# Patient Record
Sex: MALE | Race: WHITE | NOT HISPANIC OR LATINO | URBAN - METROPOLITAN AREA
[De-identification: names, ages, dates, MRNs, and addresses within clinical notes are randomized per-mention and may not be internally consistent; named-entity substitution may affect disease eponyms.]

---

## 2023-04-13 DIAGNOSIS — I10 HTN (HYPERTENSION), BENIGN: Primary | ICD-10-CM

## 2023-04-13 RX ORDER — LISINOPRIL 2.5 MG/1
2.5 TABLET ORAL DAILY
Qty: 90 TABLET | Refills: 1 | Status: SHIPPED | OUTPATIENT
Start: 2023-04-13 | End: 2023-09-20 | Stop reason: SDUPTHER

## 2023-04-13 RX ORDER — LISINOPRIL 2.5 MG/1
1 TABLET ORAL DAILY
COMMUNITY
Start: 2021-08-21 | End: 2023-04-13 | Stop reason: SDUPTHER

## 2023-04-15 DIAGNOSIS — E78.1 HYPERTRIGLYCERIDEMIA: Primary | ICD-10-CM

## 2023-04-15 DIAGNOSIS — I10 HTN (HYPERTENSION), BENIGN: ICD-10-CM

## 2023-04-15 RX ORDER — FENOFIBRATE 160 MG/1
1 TABLET ORAL DAILY
COMMUNITY
Start: 2021-02-15 | End: 2023-04-15 | Stop reason: SDUPTHER

## 2023-04-15 RX ORDER — FENOFIBRATE 160 MG/1
160 TABLET ORAL DAILY
Qty: 90 TABLET | Refills: 1 | Status: SHIPPED | OUTPATIENT
Start: 2023-04-15 | End: 2023-10-10

## 2023-05-03 ENCOUNTER — TELEMEDICINE (OUTPATIENT)
Dept: PRIMARY CARE | Facility: CLINIC | Age: 68
End: 2023-05-03
Payer: COMMERCIAL

## 2023-05-03 DIAGNOSIS — U07.1 COVID-19 VIRUS DETECTED: Primary | ICD-10-CM

## 2023-05-03 PROCEDURE — 99212 OFFICE O/P EST SF 10 MIN: CPT | Performed by: INTERNAL MEDICINE

## 2023-05-03 RX ORDER — BENZONATATE 100 MG/1
100 CAPSULE ORAL 3 TIMES DAILY PRN
Qty: 30 CAPSULE | Refills: 1 | Status: SHIPPED | OUTPATIENT
Start: 2023-05-03 | End: 2023-05-13

## 2023-05-03 NOTE — PROGRESS NOTES
Subjective   Patient ID: Raheel Bruno is a 67 y.o. male who presents for No chief complaint on file..    HPI patient is attended by video call because he is complaining of cough congestion for the past 2 days.  He tested positive for COVID-19 this morning.  His wife also tested positive for COVID few days ago.  He is otherwise doing well and denies any fever, chills, shortness of breath, wheezing, headache, loss of taste and smell.  Past medical history significant for hypertension dyslipidemia and prediabetes.    Review of Systems   Constitutional: Negative.    HENT:  Positive for congestion.    Eyes: Negative.    Respiratory:  Positive for cough.    Cardiovascular: Negative.    Gastrointestinal: Negative.    Endocrine: Negative.    Genitourinary: Negative.    Musculoskeletal: Negative.    Skin: Negative.    Allergic/Immunologic: Negative.    Neurological: Negative.    Hematological: Negative.    Psychiatric/Behavioral: Negative.         Objective   There were no vitals taken for this visit.    Physical Examnot done    Assessment/Plan    patient is encouraged drink fluids and is started on Tessalon Perles and Lagevrio leg Breo for recent COVID 19 infection.  He will continue his routine home medication.  He will follow-up with his PCP if he has no improvement in his symptoms.

## 2023-05-05 ASSESSMENT — ENCOUNTER SYMPTOMS
COUGH: 1
CONSTITUTIONAL NEGATIVE: 1
ENDOCRINE NEGATIVE: 1
MUSCULOSKELETAL NEGATIVE: 1
EYES NEGATIVE: 1
GASTROINTESTINAL NEGATIVE: 1
PSYCHIATRIC NEGATIVE: 1
HEMATOLOGIC/LYMPHATIC NEGATIVE: 1
NEUROLOGICAL NEGATIVE: 1
CARDIOVASCULAR NEGATIVE: 1
ALLERGIC/IMMUNOLOGIC NEGATIVE: 1

## 2023-05-11 ENCOUNTER — APPOINTMENT (OUTPATIENT)
Dept: PRIMARY CARE | Facility: CLINIC | Age: 68
End: 2023-05-11
Payer: COMMERCIAL

## 2023-06-14 ENCOUNTER — OFFICE VISIT (OUTPATIENT)
Dept: PRIMARY CARE | Facility: CLINIC | Age: 68
End: 2023-06-14
Payer: COMMERCIAL

## 2023-06-14 VITALS
OXYGEN SATURATION: 97 % | WEIGHT: 184 LBS | BODY MASS INDEX: 31.41 KG/M2 | RESPIRATION RATE: 18 BRPM | HEIGHT: 64 IN | DIASTOLIC BLOOD PRESSURE: 78 MMHG | HEART RATE: 66 BPM | SYSTOLIC BLOOD PRESSURE: 122 MMHG | TEMPERATURE: 97.3 F

## 2023-06-14 DIAGNOSIS — I10 ESSENTIAL (PRIMARY) HYPERTENSION: ICD-10-CM

## 2023-06-14 DIAGNOSIS — J06.9 VIRAL URI WITH COUGH: ICD-10-CM

## 2023-06-14 DIAGNOSIS — E78.1 HYPERTRIGLYCERIDEMIA: ICD-10-CM

## 2023-06-14 DIAGNOSIS — Z00.00 ROUTINE GENERAL MEDICAL EXAMINATION AT HEALTH CARE FACILITY: Primary | ICD-10-CM

## 2023-06-14 DIAGNOSIS — Z12.11 SCREEN FOR COLON CANCER: ICD-10-CM

## 2023-06-14 DIAGNOSIS — Z13.6 SCREENING FOR CARDIOVASCULAR CONDITION: ICD-10-CM

## 2023-06-14 DIAGNOSIS — Z13.1 DIABETES MELLITUS SCREENING: ICD-10-CM

## 2023-06-14 DIAGNOSIS — R35.1 NOCTURIA: ICD-10-CM

## 2023-06-14 DIAGNOSIS — E66.9 CLASS 1 OBESITY WITH BODY MASS INDEX (BMI) OF 31.0 TO 31.9 IN ADULT, UNSPECIFIED OBESITY TYPE, UNSPECIFIED WHETHER SERIOUS COMORBIDITY PRESENT: ICD-10-CM

## 2023-06-14 PROBLEM — G47.00 INSOMNIA: Status: ACTIVE | Noted: 2023-06-14

## 2023-06-14 PROBLEM — R73.09 ELEVATED GLUCOSE: Status: ACTIVE | Noted: 2023-06-14

## 2023-06-14 PROCEDURE — 3078F DIAST BP <80 MM HG: CPT | Performed by: FAMILY MEDICINE

## 2023-06-14 PROCEDURE — G0439 PPPS, SUBSEQ VISIT: HCPCS | Performed by: FAMILY MEDICINE

## 2023-06-14 PROCEDURE — 1160F RVW MEDS BY RX/DR IN RCRD: CPT | Performed by: FAMILY MEDICINE

## 2023-06-14 PROCEDURE — 3074F SYST BP LT 130 MM HG: CPT | Performed by: FAMILY MEDICINE

## 2023-06-14 PROCEDURE — 3008F BODY MASS INDEX DOCD: CPT | Performed by: FAMILY MEDICINE

## 2023-06-14 PROCEDURE — 99214 OFFICE O/P EST MOD 30 MIN: CPT | Performed by: FAMILY MEDICINE

## 2023-06-14 PROCEDURE — 1036F TOBACCO NON-USER: CPT | Performed by: FAMILY MEDICINE

## 2023-06-14 PROCEDURE — 1159F MED LIST DOCD IN RCRD: CPT | Performed by: FAMILY MEDICINE

## 2023-06-14 RX ORDER — ATORVASTATIN CALCIUM 20 MG/1
20 TABLET, FILM COATED ORAL
Qty: 90 TABLET | Refills: 3 | Status: SHIPPED | OUTPATIENT
Start: 2023-06-14 | End: 2024-05-21

## 2023-06-14 RX ORDER — ATORVASTATIN CALCIUM 20 MG/1
20 TABLET, FILM COATED ORAL
COMMUNITY
Start: 2017-02-16 | End: 2023-06-14 | Stop reason: SDUPTHER

## 2023-06-14 RX ORDER — ASPIRIN 81 MG/1
TABLET ORAL
COMMUNITY

## 2023-06-14 ASSESSMENT — PATIENT HEALTH QUESTIONNAIRE - PHQ9
1. LITTLE INTEREST OR PLEASURE IN DOING THINGS: NOT AT ALL
2. FEELING DOWN, DEPRESSED OR HOPELESS: NOT AT ALL
SUM OF ALL RESPONSES TO PHQ9 QUESTIONS 1 AND 2: 0

## 2023-06-14 ASSESSMENT — ENCOUNTER SYMPTOMS
DIARRHEA: 0
COUGH: 1
DEPRESSION: 0
FATIGUE: 0
HEADACHES: 0
ARTHRALGIAS: 0
VOMITING: 0
CONSTIPATION: 0
MYALGIAS: 0
POLYDIPSIA: 0
ABDOMINAL PAIN: 0
PALPITATIONS: 0
CHEST TIGHTNESS: 0
LOSS OF SENSATION IN FEET: 0
FEVER: 0
NAUSEA: 0
OCCASIONAL FEELINGS OF UNSTEADINESS: 0
SHORTNESS OF BREATH: 0
DIFFICULTY URINATING: 0
SORE THROAT: 0
WHEEZING: 0
CHILLS: 0

## 2023-06-14 ASSESSMENT — COPD QUESTIONNAIRES: COPD: 0

## 2023-06-14 NOTE — PROGRESS NOTES
"Subjective   Reason for Visit: Raheel Bruno is an 67 y.o. male here for a Medicare Wellness visit.          Reviewed all medications by prescribing practitioner or clinical pharmacist (such as prescriptions, OTCs, herbal therapies and supplements) and documented in the medical record.    Cough  This is a new problem. The current episode started in the past 7 days. The problem has been gradually improving. The problem occurs constantly. The cough is Productive of sputum. Pertinent negatives include no chest pain, chills, fever, headaches, myalgias, rash, sore throat, shortness of breath or wheezing. The symptoms are aggravated by lying down and pollens. The treatment provided mild relief. There is no history of asthma or COPD.     HLD: tries to watch diet, tries to stay active    HTN: well controlled, no issues    Patient Care Team:  Caro Lea MD as PCP - General (Family Medicine)  Alireza Garcia MD as PCP - [facility] PCP     Review of Systems   Constitutional:  Negative for chills, fatigue and fever.   HENT:  Negative for sore throat.    Eyes:  Negative for visual disturbance.   Respiratory:  Positive for cough. Negative for chest tightness, shortness of breath and wheezing.    Cardiovascular:  Negative for chest pain and palpitations.   Gastrointestinal:  Negative for abdominal pain, constipation, diarrhea, nausea and vomiting.   Endocrine: Negative for polydipsia and polyuria.   Genitourinary:  Negative for difficulty urinating.   Musculoskeletal:  Negative for arthralgias and myalgias.   Skin:  Negative for rash.   Neurological:  Negative for headaches.   Psychiatric/Behavioral:  Negative for behavioral problems.        Objective   Vitals:  /78 (BP Location: Left arm, Patient Position: Sitting)   Pulse 66   Temp 36.3 °C (97.3 °F) (Temporal)   Resp 18   Ht 1.626 m (5' 4.02\")   Wt 83.5 kg (184 lb)   SpO2 97%   BMI 31.57 kg/m²       Physical Exam  Constitutional:       Appearance: Normal " appearance.   HENT:      Head: Normocephalic and atraumatic.      Right Ear: External ear normal.      Left Ear: External ear normal.      Nose: Nose normal.   Eyes:      Extraocular Movements: Extraocular movements intact.      Conjunctiva/sclera: Conjunctivae normal.      Pupils: Pupils are equal, round, and reactive to light.   Cardiovascular:      Rate and Rhythm: Normal rate and regular rhythm.   Pulmonary:      Effort: Pulmonary effort is normal.      Breath sounds: Normal breath sounds.   Abdominal:      General: Abdomen is flat. Bowel sounds are normal.      Palpations: Abdomen is soft.   Musculoskeletal:         General: Normal range of motion.      Cervical back: Normal range of motion and neck supple.   Skin:     General: Skin is warm.   Neurological:      General: No focal deficit present.      Mental Status: He is alert and oriented to person, place, and time.   Psychiatric:         Mood and Affect: Mood normal.         Assessment/Plan   Problem List Items Addressed This Visit       Essential (primary) hypertension    Current Assessment & Plan     Well controlled  Continue current meds  Check renal function         Hypertriglyceridemia    Current Assessment & Plan     Due for lipid panel, LFTs  Continue to work on lifestyle         Relevant Medications    atorvastatin (Lipitor) 20 mg tablet    Routine general medical examination at health care facility - Primary    Relevant Orders    CBC    Viral URI with cough    Current Assessment & Plan     Recommend symptomatic treatment including increased hydration, regular nasal saline, use of analgesics such as acetaminophen/ibuprofen and humidified air  Follow up if symptoms fail to improve            Other Visit Diagnoses       Class 1 obesity with body mass index (BMI) of 31.0 to 31.9 in adult, unspecified obesity type, unspecified whether serious comorbidity present        Diabetes mellitus screening        Relevant Orders    Hemoglobin A1C    Comprehensive  Metabolic Panel    Screening for cardiovascular condition        Relevant Orders    Lipid panel    Screen for colon cancer        Relevant Orders    Colonoscopy    Nocturia        Relevant Orders    Prostate Specific Antigen

## 2023-06-14 NOTE — ASSESSMENT & PLAN NOTE
Recommend symptomatic treatment including increased hydration, regular nasal saline, use of analgesics such as acetaminophen/ibuprofen and humidified air  Follow up if symptoms fail to improve

## 2023-06-23 DIAGNOSIS — J06.9 VIRAL URI WITH COUGH: Primary | ICD-10-CM

## 2023-06-23 RX ORDER — BENZONATATE 200 MG/1
200 CAPSULE ORAL 3 TIMES DAILY PRN
Qty: 42 CAPSULE | Refills: 0 | Status: SHIPPED | OUTPATIENT
Start: 2023-06-23 | End: 2023-07-23

## 2023-06-23 RX ORDER — AZITHROMYCIN 250 MG/1
250 TABLET, FILM COATED ORAL DAILY
Qty: 6 TABLET | Refills: 0 | Status: SHIPPED | OUTPATIENT
Start: 2023-06-23 | End: 2023-09-26 | Stop reason: ALTCHOICE

## 2023-07-25 ENCOUNTER — LAB (OUTPATIENT)
Dept: LAB | Facility: LAB | Age: 68
End: 2023-07-25
Payer: COMMERCIAL

## 2023-07-25 DIAGNOSIS — Z13.6 SCREENING FOR CARDIOVASCULAR CONDITION: ICD-10-CM

## 2023-07-25 DIAGNOSIS — Z00.00 ROUTINE GENERAL MEDICAL EXAMINATION AT HEALTH CARE FACILITY: ICD-10-CM

## 2023-07-25 DIAGNOSIS — R35.1 NOCTURIA: ICD-10-CM

## 2023-07-25 DIAGNOSIS — Z13.1 DIABETES MELLITUS SCREENING: ICD-10-CM

## 2023-07-25 LAB
ALANINE AMINOTRANSFERASE (SGPT) (U/L) IN SER/PLAS: 31 U/L (ref 10–52)
ALBUMIN (G/DL) IN SER/PLAS: 4.3 G/DL (ref 3.4–5)
ALKALINE PHOSPHATASE (U/L) IN SER/PLAS: 36 U/L (ref 33–136)
ANION GAP IN SER/PLAS: 14 MMOL/L (ref 10–20)
ASPARTATE AMINOTRANSFERASE (SGOT) (U/L) IN SER/PLAS: 20 U/L (ref 9–39)
BILIRUBIN TOTAL (MG/DL) IN SER/PLAS: 0.4 MG/DL (ref 0–1.2)
CALCIUM (MG/DL) IN SER/PLAS: 9.7 MG/DL (ref 8.6–10.6)
CARBON DIOXIDE, TOTAL (MMOL/L) IN SER/PLAS: 27 MMOL/L (ref 21–32)
CHLORIDE (MMOL/L) IN SER/PLAS: 106 MMOL/L (ref 98–107)
CHOLESTEROL (MG/DL) IN SER/PLAS: 173 MG/DL (ref 0–199)
CHOLESTEROL IN HDL (MG/DL) IN SER/PLAS: 45.2 MG/DL
CHOLESTEROL/HDL RATIO: 3.8
CREATININE (MG/DL) IN SER/PLAS: 1.21 MG/DL (ref 0.5–1.3)
ERYTHROCYTE DISTRIBUTION WIDTH (RATIO) BY AUTOMATED COUNT: 14 % (ref 11.5–14.5)
ERYTHROCYTE MEAN CORPUSCULAR HEMOGLOBIN CONCENTRATION (G/DL) BY AUTOMATED: 32.9 G/DL (ref 32–36)
ERYTHROCYTE MEAN CORPUSCULAR VOLUME (FL) BY AUTOMATED COUNT: 92 FL (ref 80–100)
ERYTHROCYTES (10*6/UL) IN BLOOD BY AUTOMATED COUNT: 4.98 X10E12/L (ref 4.5–5.9)
ESTIMATED AVERAGE GLUCOSE FOR HBA1C: 134 MG/DL
GFR MALE: 65 ML/MIN/1.73M2
GLUCOSE (MG/DL) IN SER/PLAS: 114 MG/DL (ref 74–99)
HEMATOCRIT (%) IN BLOOD BY AUTOMATED COUNT: 45.6 % (ref 41–52)
HEMOGLOBIN (G/DL) IN BLOOD: 15 G/DL (ref 13.5–17.5)
HEMOGLOBIN A1C/HEMOGLOBIN TOTAL IN BLOOD: 6.3 %
LDL: 92 MG/DL (ref 0–99)
LEUKOCYTES (10*3/UL) IN BLOOD BY AUTOMATED COUNT: 8.1 X10E9/L (ref 4.4–11.3)
NRBC (PER 100 WBCS) BY AUTOMATED COUNT: 0 /100 WBC (ref 0–0)
PLATELETS (10*3/UL) IN BLOOD AUTOMATED COUNT: 271 X10E9/L (ref 150–450)
POTASSIUM (MMOL/L) IN SER/PLAS: 4.3 MMOL/L (ref 3.5–5.3)
PROSTATE SPECIFIC AG (NG/ML) IN SER/PLAS: 2.9 NG/ML (ref 0–4)
PROTEIN TOTAL: 6.7 G/DL (ref 6.4–8.2)
SODIUM (MMOL/L) IN SER/PLAS: 143 MMOL/L (ref 136–145)
TRIGLYCERIDE (MG/DL) IN SER/PLAS: 180 MG/DL (ref 0–149)
UREA NITROGEN (MG/DL) IN SER/PLAS: 20 MG/DL (ref 6–23)
VLDL: 36 MG/DL (ref 0–40)

## 2023-07-25 PROCEDURE — 83036 HEMOGLOBIN GLYCOSYLATED A1C: CPT

## 2023-07-25 PROCEDURE — 36415 COLL VENOUS BLD VENIPUNCTURE: CPT

## 2023-07-25 PROCEDURE — 85027 COMPLETE CBC AUTOMATED: CPT

## 2023-07-25 PROCEDURE — 80053 COMPREHEN METABOLIC PANEL: CPT

## 2023-07-25 PROCEDURE — 84153 ASSAY OF PSA TOTAL: CPT

## 2023-07-25 PROCEDURE — 80061 LIPID PANEL: CPT

## 2023-09-20 DIAGNOSIS — I10 HTN (HYPERTENSION), BENIGN: ICD-10-CM

## 2023-09-20 RX ORDER — LISINOPRIL 2.5 MG/1
2.5 TABLET ORAL DAILY
Qty: 90 TABLET | Refills: 1 | Status: SHIPPED | OUTPATIENT
Start: 2023-09-20 | End: 2024-03-18

## 2023-09-26 ENCOUNTER — OFFICE VISIT (OUTPATIENT)
Dept: PRIMARY CARE | Facility: CLINIC | Age: 68
End: 2023-09-26
Payer: COMMERCIAL

## 2023-09-26 VITALS
SYSTOLIC BLOOD PRESSURE: 120 MMHG | WEIGHT: 182.8 LBS | HEIGHT: 64 IN | OXYGEN SATURATION: 97 % | TEMPERATURE: 97.3 F | HEART RATE: 72 BPM | DIASTOLIC BLOOD PRESSURE: 80 MMHG | BODY MASS INDEX: 31.21 KG/M2

## 2023-09-26 DIAGNOSIS — R68.84 JAW PAIN, NON-TMJ: Primary | ICD-10-CM

## 2023-09-26 PROBLEM — R73.09 ELEVATED GLUCOSE: Status: RESOLVED | Noted: 2023-06-14 | Resolved: 2023-09-26

## 2023-09-26 PROBLEM — Z00.00 ROUTINE GENERAL MEDICAL EXAMINATION AT HEALTH CARE FACILITY: Status: RESOLVED | Noted: 2023-06-14 | Resolved: 2023-09-26

## 2023-09-26 PROBLEM — J06.9 VIRAL URI WITH COUGH: Status: RESOLVED | Noted: 2023-06-14 | Resolved: 2023-09-26

## 2023-09-26 PROCEDURE — 1125F AMNT PAIN NOTED PAIN PRSNT: CPT | Performed by: FAMILY MEDICINE

## 2023-09-26 PROCEDURE — 1036F TOBACCO NON-USER: CPT | Performed by: FAMILY MEDICINE

## 2023-09-26 PROCEDURE — 1160F RVW MEDS BY RX/DR IN RCRD: CPT | Performed by: FAMILY MEDICINE

## 2023-09-26 PROCEDURE — 3074F SYST BP LT 130 MM HG: CPT | Performed by: FAMILY MEDICINE

## 2023-09-26 PROCEDURE — 3079F DIAST BP 80-89 MM HG: CPT | Performed by: FAMILY MEDICINE

## 2023-09-26 PROCEDURE — 1159F MED LIST DOCD IN RCRD: CPT | Performed by: FAMILY MEDICINE

## 2023-09-26 PROCEDURE — 99213 OFFICE O/P EST LOW 20 MIN: CPT | Performed by: FAMILY MEDICINE

## 2023-09-26 PROCEDURE — 3008F BODY MASS INDEX DOCD: CPT | Performed by: FAMILY MEDICINE

## 2023-09-26 ASSESSMENT — PAIN SCALES - GENERAL: PAINLEVEL: 6

## 2023-09-26 NOTE — ASSESSMENT & PLAN NOTE
No ear infection noted  Differential includes dental vs less likely sialadenitis vs inflammation from other source  Discussed conservative management at this time  Recommend monitoring and follow up with dentist for evaluation if pain persists in the jaw  If any swelling in the neck, cheek or any new rash, redness then return for reevaluation  Discussed red flag symptoms which would warrant reevaluation  Continue analgesics like ibuprofen, can consider warm compress  If symptoms worsen or change then return for reevaluation

## 2023-09-26 NOTE — PROGRESS NOTES
"Subjective   Patient ID: Raheel Bruno is a 68 y.o. male who presents for Earache (Left ear ache ).    HPI   Started having left ear pain, having some tragus discomfort, now has spread down to more jaw pain, going on for about 4 days, no improvement, some movement of the pain, taking ibuprofen which decreases pain but does not eliminate.  No fever or chills.      Review of Systems  Negative unless noted in HPI    Objective   /80 (BP Location: Left arm, Patient Position: Sitting, BP Cuff Size: Adult)   Pulse 72   Temp 36.3 °C (97.3 °F) (Temporal)   Ht 1.626 m (5' 4\")   Wt 82.9 kg (182 lb 12.8 oz)   SpO2 97%   BMI 31.38 kg/m²     Physical Exam  Constitutional:       Appearance: Normal appearance.   HENT:      Head: Normocephalic and atraumatic.      Jaw: Tenderness (left jaw) present.      Salivary Glands: Left salivary gland is not diffusely enlarged or tender.      Left Ear: Tympanic membrane, ear canal and external ear normal.   Neurological:      Mental Status: He is alert.         Assessment/Plan   Problem List Items Addressed This Visit             ICD-10-CM    Jaw pain, non-TMJ - Primary R68.84     No ear infection noted  Differential includes dental vs less likely sialadenitis vs inflammation from other source  Discussed conservative management at this time  Recommend monitoring and follow up with dentist for evaluation if pain persists in the jaw  If any swelling in the neck, cheek or any new rash, redness then return for reevaluation  Discussed red flag symptoms which would warrant reevaluation  Continue analgesics like ibuprofen, can consider warm compress  If symptoms worsen or change then return for reevaluation               "

## 2023-10-09 DIAGNOSIS — E78.1 HYPERTRIGLYCERIDEMIA: ICD-10-CM

## 2023-10-10 RX ORDER — FENOFIBRATE 160 MG/1
160 TABLET ORAL DAILY
Qty: 90 TABLET | Refills: 3 | Status: SHIPPED | OUTPATIENT
Start: 2023-10-10

## 2023-10-13 DIAGNOSIS — R53.83 OTHER FATIGUE: Primary | ICD-10-CM

## 2023-10-13 DIAGNOSIS — R06.83 SNORING: ICD-10-CM

## 2024-01-03 ENCOUNTER — OFFICE VISIT (OUTPATIENT)
Dept: PRIMARY CARE | Facility: CLINIC | Age: 69
End: 2024-01-03
Payer: COMMERCIAL

## 2024-01-03 VITALS
HEART RATE: 55 BPM | DIASTOLIC BLOOD PRESSURE: 76 MMHG | BODY MASS INDEX: 31 KG/M2 | SYSTOLIC BLOOD PRESSURE: 124 MMHG | WEIGHT: 181.6 LBS | TEMPERATURE: 97.5 F | HEIGHT: 64 IN | OXYGEN SATURATION: 98 %

## 2024-01-03 DIAGNOSIS — I10 ESSENTIAL (PRIMARY) HYPERTENSION: Primary | ICD-10-CM

## 2024-01-03 DIAGNOSIS — R20.9 ALTERATION IN TACTILE SENSE: ICD-10-CM

## 2024-01-03 PROCEDURE — 3078F DIAST BP <80 MM HG: CPT | Performed by: FAMILY MEDICINE

## 2024-01-03 PROCEDURE — 3074F SYST BP LT 130 MM HG: CPT | Performed by: FAMILY MEDICINE

## 2024-01-03 PROCEDURE — 1126F AMNT PAIN NOTED NONE PRSNT: CPT | Performed by: FAMILY MEDICINE

## 2024-01-03 PROCEDURE — 3008F BODY MASS INDEX DOCD: CPT | Performed by: FAMILY MEDICINE

## 2024-01-03 PROCEDURE — 1036F TOBACCO NON-USER: CPT | Performed by: FAMILY MEDICINE

## 2024-01-03 PROCEDURE — 1159F MED LIST DOCD IN RCRD: CPT | Performed by: FAMILY MEDICINE

## 2024-01-03 PROCEDURE — 1160F RVW MEDS BY RX/DR IN RCRD: CPT | Performed by: FAMILY MEDICINE

## 2024-01-03 PROCEDURE — 99214 OFFICE O/P EST MOD 30 MIN: CPT | Performed by: FAMILY MEDICINE

## 2024-01-03 ASSESSMENT — PAIN SCALES - GENERAL: PAINLEVEL: 0-NO PAIN

## 2024-01-03 ASSESSMENT — PATIENT HEALTH QUESTIONNAIRE - PHQ9
SUM OF ALL RESPONSES TO PHQ9 QUESTIONS 1 AND 2: 0
1. LITTLE INTEREST OR PLEASURE IN DOING THINGS: NOT AT ALL
2. FEELING DOWN, DEPRESSED OR HOPELESS: NOT AT ALL

## 2024-01-03 ASSESSMENT — ENCOUNTER SYMPTOMS
LOSS OF SENSATION IN FEET: 0
DEPRESSION: 0
OCCASIONAL FEELINGS OF UNSTEADINESS: 0

## 2024-01-03 NOTE — PROGRESS NOTES
"Subjective   Patient ID: Raheel Bruno is a 68 y.o. male who presents for Hypertension.    HPI   HTN: doing well with medication, no issues or problems    Having a strange sensation on the left shoulder in the last month.  Feels like hair brushing against shoulder but nothing is there.  Happens intermittently.  No pattern like exertion, activity seems to be related to the discomfort. No neck issues.  No abdominal pain.  No rash or skin issues.    Concerned that this is a tactile hallucination and could be related to a dementia.  Father had supranuclear palsy so always concerned.  Denies any significant mental health issues or cognitive changes out of the norm.    Review of Systems  Negative unless noted in HPI    Objective   /76 (BP Location: Left arm, Patient Position: Sitting, BP Cuff Size: Small adult)   Pulse 55   Temp 36.4 °C (97.5 °F) (Temporal)   Ht 1.626 m (5' 4\")   Wt 82.4 kg (181 lb 9.6 oz)   SpO2 98%   BMI 31.17 kg/m²     Physical Exam  Constitutional:       Appearance: He is normal weight.   Eyes:      Extraocular Movements: Extraocular movements intact.      Pupils: Pupils are equal, round, and reactive to light.   Cardiovascular:      Rate and Rhythm: Normal rate and regular rhythm.      Heart sounds: No murmur heard.     No gallop.   Pulmonary:      Effort: Pulmonary effort is normal.      Breath sounds: Normal breath sounds.   Musculoskeletal:         General: No swelling or tenderness. Normal range of motion.      Cervical back: Normal range of motion.   Skin:     General: Skin is warm and dry.      Findings: No rash.   Neurological:      General: No focal deficit present.      Mental Status: He is alert and oriented to person, place, and time.         Assessment/Plan   Problem List Items Addressed This Visit             ICD-10-CM    Essential (primary) hypertension - Primary I10     Well controlled, continue current regimen  Labs up to date  Encourage ongoing lifestyle monitoring      "    Alteration in tactile sense R20.9     Atypical sensation of the left shoulder  No pattern or obvious cause  Differential includes presentation of neuropathy, HSV manifestation vs dermatologic issue  Discussed tactile hallucinations and overall no obvious triggers  Recommend trial of moisturizer and cortisone cream to see if this alleviates  If persistent or worsening return for reevaluation and will obtain lab work and consider imaging  Discussed possible neurology referral, pt hesitant regarding workup

## 2024-01-03 NOTE — ASSESSMENT & PLAN NOTE
Atypical sensation of the left shoulder  No pattern or obvious cause  Differential includes presentation of neuropathy, HSV manifestation vs dermatologic issue  Discussed tactile hallucinations and overall no obvious triggers  Recommend trial of moisturizer and cortisone cream to see if this alleviates  If persistent or worsening return for reevaluation and will obtain lab work and consider imaging  Discussed possible neurology referral, pt hesitant regarding workup

## 2024-01-03 NOTE — ASSESSMENT & PLAN NOTE
Well controlled, continue current regimen  Labs up to date  Encourage ongoing lifestyle monitoring

## 2024-02-26 ENCOUNTER — CLINICAL SUPPORT (OUTPATIENT)
Dept: SLEEP MEDICINE | Facility: HOSPITAL | Age: 69
End: 2024-02-26
Payer: COMMERCIAL

## 2024-02-26 DIAGNOSIS — R06.83 SNORING: ICD-10-CM

## 2024-02-26 DIAGNOSIS — R53.83 OTHER FATIGUE: ICD-10-CM

## 2024-02-26 PROCEDURE — 95806 SLEEP STUDY UNATT&RESP EFFT: CPT | Performed by: GENERAL PRACTICE

## 2024-03-12 DIAGNOSIS — G47.33 MODERATE OBSTRUCTIVE SLEEP APNEA: Primary | ICD-10-CM

## 2024-03-18 DIAGNOSIS — I10 HTN (HYPERTENSION), BENIGN: ICD-10-CM

## 2024-03-18 RX ORDER — LISINOPRIL 2.5 MG/1
2.5 TABLET ORAL DAILY
Qty: 90 TABLET | Refills: 3 | Status: SHIPPED | OUTPATIENT
Start: 2024-03-18

## 2024-05-21 DIAGNOSIS — E78.1 HYPERTRIGLYCERIDEMIA: ICD-10-CM

## 2024-05-21 RX ORDER — ATORVASTATIN CALCIUM 20 MG/1
20 TABLET, FILM COATED ORAL DAILY
Qty: 90 TABLET | Refills: 3 | Status: SHIPPED | OUTPATIENT
Start: 2024-05-21

## 2024-06-20 ENCOUNTER — APPOINTMENT (OUTPATIENT)
Dept: PRIMARY CARE | Facility: CLINIC | Age: 69
End: 2024-06-20
Payer: COMMERCIAL

## 2024-06-20 VITALS
OXYGEN SATURATION: 96 % | BODY MASS INDEX: 30.39 KG/M2 | DIASTOLIC BLOOD PRESSURE: 88 MMHG | WEIGHT: 178 LBS | HEIGHT: 64 IN | HEART RATE: 60 BPM | SYSTOLIC BLOOD PRESSURE: 122 MMHG

## 2024-06-20 DIAGNOSIS — M54.2 NECK PAIN ON LEFT SIDE: ICD-10-CM

## 2024-06-20 DIAGNOSIS — Z12.5 SCREENING FOR PROSTATE CANCER: ICD-10-CM

## 2024-06-20 DIAGNOSIS — Z13.6 SCREENING FOR CARDIOVASCULAR CONDITION: ICD-10-CM

## 2024-06-20 DIAGNOSIS — Z00.00 ROUTINE GENERAL MEDICAL EXAMINATION AT HEALTH CARE FACILITY: Primary | ICD-10-CM

## 2024-06-20 DIAGNOSIS — Z13.1 DIABETES MELLITUS SCREENING: ICD-10-CM

## 2024-06-20 PROCEDURE — 1159F MED LIST DOCD IN RCRD: CPT | Performed by: FAMILY MEDICINE

## 2024-06-20 PROCEDURE — 1124F ACP DISCUSS-NO DSCNMKR DOCD: CPT | Performed by: FAMILY MEDICINE

## 2024-06-20 PROCEDURE — 99397 PER PM REEVAL EST PAT 65+ YR: CPT | Performed by: FAMILY MEDICINE

## 2024-06-20 PROCEDURE — 1170F FXNL STATUS ASSESSED: CPT | Performed by: FAMILY MEDICINE

## 2024-06-20 PROCEDURE — 3074F SYST BP LT 130 MM HG: CPT | Performed by: FAMILY MEDICINE

## 2024-06-20 PROCEDURE — 3079F DIAST BP 80-89 MM HG: CPT | Performed by: FAMILY MEDICINE

## 2024-06-20 PROCEDURE — 1160F RVW MEDS BY RX/DR IN RCRD: CPT | Performed by: FAMILY MEDICINE

## 2024-06-20 ASSESSMENT — ACTIVITIES OF DAILY LIVING (ADL)
DRESSING: INDEPENDENT
GROCERY_SHOPPING: INDEPENDENT
MANAGING_FINANCES: INDEPENDENT
TAKING_MEDICATION: INDEPENDENT
BATHING: INDEPENDENT
DOING_HOUSEWORK: INDEPENDENT

## 2024-06-20 ASSESSMENT — PATIENT HEALTH QUESTIONNAIRE - PHQ9
2. FEELING DOWN, DEPRESSED OR HOPELESS: NOT AT ALL
1. LITTLE INTEREST OR PLEASURE IN DOING THINGS: NOT AT ALL
SUM OF ALL RESPONSES TO PHQ9 QUESTIONS 1 AND 2: 0

## 2024-06-20 NOTE — PROGRESS NOTES
Reason for Visit: Annual Physical Exam    HPI:  Presents for wellness exam  Doing ok overall  Continues to have some left sided neck/upper arm discomfort, continues to be in consistent in terms of frequency or exacerbations; stays limited to the shoulder and lateral left neck, does not radiate to jar or down the left arm, no associated symptoms     Active Problem List  Patient Active Problem List   Diagnosis    Essential (primary) hypertension    Hemorrhoids    Hyperglycemia    Hypertriglyceridemia    Insomnia    Routine general medical examination at health care facility    Jaw pain, non-TMJ    Alteration in tactile sense    Neck pain on left side       Comprehensive Medical/Surgical/Social/Family History  Past Medical History:   Diagnosis Date    Elevated glucose 06/14/2023    Routine general medical examination at health care facility 06/14/2023    Viral URI with cough 06/14/2023     Past Surgical History:   Procedure Laterality Date    OTHER SURGICAL HISTORY  03/01/2021    Cholecystectomy laparoscopic     Social History     Tobacco Use    Smoking status: Former     Types: Cigarettes     Passive exposure: Never    Smokeless tobacco: Never   Substance Use Topics    Alcohol use: Yes    Drug use: Never     No family history on file.      Allergies and Medications  Bee pollen, Grass pollen, Penicillins, and Rosuvastatin  Current Outpatient Medications on File Prior to Visit   Medication Sig Dispense Refill    aspirin 81 mg EC tablet Take by mouth.      atorvastatin (Lipitor) 20 mg tablet TAKE 1 TABLET DAILY 90 tablet 3    fenofibrate (Triglide) 160 mg tablet TAKE 1 TABLET DAILY 90 tablet 3    lisinopril 2.5 mg tablet TAKE 1 TABLET DAILY 90 tablet 3     No current facility-administered medications on file prior to visit.         ROS otherwise negative aside from what was mentioned above in HPI.    Vitals  /88 (BP Location: Right arm, Patient Position: Sitting, BP Cuff Size: Adult)   Pulse 60   Ht 1.626 m (5'  "4\")   Wt 80.7 kg (178 lb)   SpO2 96%   BMI 30.55 kg/m²   Body mass index is 30.55 kg/m².  Physical Exam  Gen: Alert, NAD  HEENT:  PERRL, EOMI, conjunctiva and sclera normal in appearance. External auditory canals/TMs normal; Oral cavity and posterior pharynx without lesions/exudate  Neck:  Supple with FROM; No masses/nodes palpable; Thyroid nontender and without nodules; No ADAM  Respiratory:  Lungs CTAB  Cardiovascular:  Heart RRR. No M/R/G. Peripheral pulses equal bilaterally  Abdomen:  Soft, nontender, No R/G/R; No HSM or masses palpated  Extremities:  FROM all extremities; Muscle strength grossly normal with good tone  Neuro:  CN II-XII intact; Gross motor and sensory intact  Skin:  No suspicious lesions present    Assessment and Plan:  Problem List Items Addressed This Visit       Routine general medical examination at health care facility - Primary    Current Assessment & Plan     Recommended screening guidelines addressed and orders placed as indicated by age and chronic conditions  Screening labs ordered, will call with results  Discussed colon cancer screening, recommend colonoscopy or at the least cologuard, will look into insurance coverage and let me know  Continue to work on healthy lifestyle including well balanced diet, regular activity, limit alcohol, no tobacco products and safe sexual practices  Follow up annually           Neck pain on left side    Current Assessment & Plan     Will obtain imaging of the cervical spine as now pain is mostly localized to the shoulder area  Consider further workup if unremarkable imaging  Suspect symptoms musculoskeletal in nature but consideration for atypical cardiac presentation or head/neck issue also kept in mind         Relevant Orders    XR cervical spine 2-3 views     Other Visit Diagnoses       Diabetes mellitus screening        Relevant Orders    Comprehensive Metabolic Panel    Hemoglobin A1C    Screening for cardiovascular condition        Relevant " Orders    Lipid panel    Screening for prostate cancer        Relevant Orders    Prostate Specific Antigen, Screen              Caro Lea MD

## 2024-06-20 NOTE — ASSESSMENT & PLAN NOTE
Will obtain imaging of the cervical spine as now pain is mostly localized to the shoulder area  Consider further workup if unremarkable imaging  Suspect symptoms musculoskeletal in nature but consideration for atypical cardiac presentation or head/neck issue also kept in mind

## 2024-06-20 NOTE — ASSESSMENT & PLAN NOTE
Recommended screening guidelines addressed and orders placed as indicated by age and chronic conditions  Screening labs ordered, will call with results  Discussed colon cancer screening, recommend colonoscopy or at the least cologuard, will look into insurance coverage and let me know  Continue to work on healthy lifestyle including well balanced diet, regular activity, limit alcohol, no tobacco products and safe sexual practices  Follow up annually

## 2024-06-28 ENCOUNTER — HOSPITAL ENCOUNTER (OUTPATIENT)
Dept: RADIOLOGY | Facility: CLINIC | Age: 69
Discharge: HOME | End: 2024-06-28
Payer: COMMERCIAL

## 2024-06-28 ENCOUNTER — LAB (OUTPATIENT)
Dept: LAB | Facility: LAB | Age: 69
End: 2024-06-28
Payer: COMMERCIAL

## 2024-06-28 DIAGNOSIS — Z12.5 SCREENING FOR PROSTATE CANCER: ICD-10-CM

## 2024-06-28 DIAGNOSIS — M54.2 NECK PAIN ON LEFT SIDE: ICD-10-CM

## 2024-06-28 DIAGNOSIS — Z13.1 DIABETES MELLITUS SCREENING: ICD-10-CM

## 2024-06-28 DIAGNOSIS — Z13.6 SCREENING FOR CARDIOVASCULAR CONDITION: ICD-10-CM

## 2024-06-28 LAB
ALBUMIN SERPL BCP-MCNC: 4.3 G/DL (ref 3.4–5)
ALP SERPL-CCNC: 36 U/L (ref 33–136)
ALT SERPL W P-5'-P-CCNC: 33 U/L (ref 10–52)
ANION GAP SERPL CALC-SCNC: 14 MMOL/L (ref 10–20)
AST SERPL W P-5'-P-CCNC: 18 U/L (ref 9–39)
BILIRUB SERPL-MCNC: 0.5 MG/DL (ref 0–1.2)
BUN SERPL-MCNC: 17 MG/DL (ref 6–23)
CALCIUM SERPL-MCNC: 9.7 MG/DL (ref 8.6–10.6)
CHLORIDE SERPL-SCNC: 103 MMOL/L (ref 98–107)
CHOLEST SERPL-MCNC: 168 MG/DL (ref 0–199)
CHOLESTEROL/HDL RATIO: 3.4
CO2 SERPL-SCNC: 28 MMOL/L (ref 21–32)
CREAT SERPL-MCNC: 1.08 MG/DL (ref 0.5–1.3)
EGFRCR SERPLBLD CKD-EPI 2021: 74 ML/MIN/1.73M*2
EST. AVERAGE GLUCOSE BLD GHB EST-MCNC: 128 MG/DL
GLUCOSE SERPL-MCNC: 101 MG/DL (ref 74–99)
HBA1C MFR BLD: 6.1 %
HDLC SERPL-MCNC: 49 MG/DL
LDLC SERPL CALC-MCNC: 92 MG/DL
NON HDL CHOLESTEROL: 119 MG/DL (ref 0–149)
POTASSIUM SERPL-SCNC: 4.7 MMOL/L (ref 3.5–5.3)
PROT SERPL-MCNC: 7 G/DL (ref 6.4–8.2)
PSA SERPL-MCNC: 4.46 NG/ML
SODIUM SERPL-SCNC: 140 MMOL/L (ref 136–145)
TRIGL SERPL-MCNC: 137 MG/DL (ref 0–149)
VLDL: 27 MG/DL (ref 0–40)

## 2024-06-28 PROCEDURE — 84153 ASSAY OF PSA TOTAL: CPT

## 2024-06-28 PROCEDURE — 72050 X-RAY EXAM NECK SPINE 4/5VWS: CPT

## 2024-06-28 PROCEDURE — 36415 COLL VENOUS BLD VENIPUNCTURE: CPT

## 2024-06-28 PROCEDURE — 80053 COMPREHEN METABOLIC PANEL: CPT

## 2024-06-28 PROCEDURE — 83036 HEMOGLOBIN GLYCOSYLATED A1C: CPT

## 2024-06-28 PROCEDURE — 80061 LIPID PANEL: CPT

## 2024-07-01 DIAGNOSIS — R97.20 ELEVATED PSA: Primary | ICD-10-CM

## 2024-07-18 DIAGNOSIS — M47.812 OSTEOARTHRITIS OF CERVICAL SPINE, UNSPECIFIED SPINAL OSTEOARTHRITIS COMPLICATION STATUS: Primary | ICD-10-CM

## 2024-09-16 NOTE — PROGRESS NOTES
Physical Therapy  Physical Therapy Orthopedic Evaluation    Patient Name: Raheel Bruno  MRN: [id number]  Today's Date: 9/17/2024  Time Calculation  Start Time: 1120  Stop Time: 1155  Time Calculation (min): 35 min    Insurance:  Visit number: 1 of 20  Authorization info: [employer]  Insurance Type: [employer]  Cert start date: 9/17/24; Cert end date: 12/15/24     General:  Reason for visit: Osteoarthritis of cervical spine, unspecified spinal osteoarthritis complication status   Referred by: Dr. Caro Lea    Assessment: Patient presents with signs and symptoms consistent with L sided neck pain, including L cervical musculature hypertonicity, cervical ROM deficits, postural impairments, cervicothoracic hypomobility, scapulothoracic strength deficits. DTR, dermatomes, myotomes, and neural tension testing WNL. Impairments resulting in limited participation in pain-free ADLs and inability to perform at their prior level of function. Pt would benefit from physical therapy to address the impairments found & listed previously in the objective section in order to return to safe and pain-free ADLs and prior level of function.       Clinic Presentation: Stable  Level of Complexity: Low  Prognosis: Good    Plan:     Planned Interventions include: therapeutic exercise, self-care home management, manual therapy, therapeutic activities, gait training, neuromuscular coordination, vasopneumatic, dry needling, aquatic therapy  Frequency: 1 x Week  Duration: 8 Weeks    Patient and/or family understands and agrees with goals and plan documented.    Current Problem:  1. Neck pain on left side  Follow Up In Physical Therapy      2. Osteoarthritis of cervical spine, unspecified spinal osteoarthritis complication status  Referral to Physical Therapy    Follow Up In Physical Therapy          Precautions:   Precautions  STEADI Fall Risk Score (The score of 4 or more indicates an increased risk of falling): 0  Precautions Comment: HBP,  "gout      Medical History Form: Reviewed (scanned into chart)    Subjective:   Subjective   Chief Complaint: Pt presents to PT with c/o L sided neck/shoulder pain. States pain began over the winter. Initially felt mild tingling that has worsened over time. Now experiencing L sided numbness/\"itchy\" sensation over neck and mild pain while at night.   Onset: Winter 2024  CHRIS: Insidious    Current Condition:   Worse    Pain:  Pain Assessment: 0-10  0-10 (Numeric) Pain Score: 1  Location: L sided neck   Description: Dull, n/t, \"itchy\" sensation  Aggravating Factors:  At night  Relieving Factors:  None  Pain Intensity:  0/10-3/10    Relevant Information (PMH & Previous Tests/Imaging): HBP, gout, pre-diabetic   C/s XR 6/29: Reversal of normal cervical lordosis which can be associated with patient positioning or muscle spasm. Grade 1 C3-4 anterolisthesis. Severe C5-6 and C6-7 spondylosis with disc height loss and osteophytes. Moderate C4-5 spondylosis as well. Vertebral body heights are preserved. Posterior elements are intact. Moderate appearing bilateral mid and lower cervical osseous foraminal stenosis, right worse than left    Previous Interventions/Treatments: None    Prior Level of Function (PLOF)  Patient previously independent with all ADLs  Exercise/Physical Activity: Cycling 2-3d/week  Work/School: Retired    Patients Living Environment: Reviewed and no concern    Primary Language: English    Patient's Goal(s) for Therapy: \"Resolves any spinal issues related to numbness\"    Red Flags: Do you have any of the following? No  Fever/chills, unexplained weight changes, dizziness/fainting, unexplained change in bowel or bladder functions, unexplained malaise or muscle weakness, night pain/sweats, numbness or tingling    Objective:  Objective     Posture: L lateral trunk lean, B knee valgus, B knee hyperext, rounded shoulders, PPT, R shoulder depression    Palpation: TTP/hypertonicity L UT, LS, paraspinals, " "suboccipitcals    Cervical AROM  Flexion 28 deg  Extension 55 deg (tightness)  R SB 15 deg  L SB 20 deg  R ROT 40 deg  L ROT 57 deg    Scapulothoracic MMT  B strength deficits present    Special Tests:  Compression: (-)  Distraction: (+)  Spurling's: (+) L    DTR (0, 1+, 2+, 3+)  Biceps (C5) R 1+; L 1+  Triceps (C7) R 1+; L 1+    Dermatomes:   WFL    Myotomes:  Shoulder shrug (C4) R 5/5; L 5/5  Shoulder abd (C5) R 5/5; L 5/5  Elbow flex (C5/6) R 5/5; L 5/5  Wrist ext (C6) R 5/5; L 5/5  Elbow ext (C7) R 5/5; L 5/5  Wrist flex (C8) R 5/5; L 5/5  Thumb ext (C8) R 5/5; L 4+/5  Finger abd (T1) R 5/5; L 5/5    ULNTT:  Radial R (-); L (-)  Ulnar R (-); L (-)  Median R (-); L (-)    Outcome Measures:  Other Measures  Neck Disability Index: 4/50     EDUCATION: Home exercise program, plan of care, activity modifications, pain management, and injury pathology       Goals: Set and discussed today  Active       PT Problem       STG       Start:  09/17/24    Expected End:  11/01/24       Patient demonstrate home exercise program adherence to supplement symptom reduction and functional gains made in clinic          LTG       Start:  09/17/24    Expected End:  12/16/24       Patient will report 75% reduction in n/t and \"itchy\" sensation to improve QOL  Patient will verbalize pain (0-10) <1/10 at worst to improve ADL tolerance   Patient will demonstrate increased cervical AROM rotation >60 degrees, and side bending >25 degrees to improve ease during ADLs  Patient will demonstrate improvement on Neck Disability Index score 0/50 to demonstrate improved functional ability              Plan of care was developed with input and agreement by the patient    Treatment Performed:  HEP: supine cervical ROT AROM, UT stretch, LS stretch, scapular retraction  Access Code [email or web address]    Charges:  Evaluation: low complexity  Treatment: 1 JOHN Millan, PT  "

## 2024-09-17 ENCOUNTER — EVALUATION (OUTPATIENT)
Dept: PHYSICAL THERAPY | Facility: CLINIC | Age: 69
End: 2024-09-17
Payer: COMMERCIAL

## 2024-09-17 DIAGNOSIS — M47.812 OSTEOARTHRITIS OF CERVICAL SPINE, UNSPECIFIED SPINAL OSTEOARTHRITIS COMPLICATION STATUS: ICD-10-CM

## 2024-09-17 DIAGNOSIS — M54.2 NECK PAIN ON LEFT SIDE: Primary | ICD-10-CM

## 2024-09-17 PROCEDURE — 97161 PT EVAL LOW COMPLEX 20 MIN: CPT | Mod: GP | Performed by: PHYSICAL THERAPIST

## 2024-09-17 PROCEDURE — 97110 THERAPEUTIC EXERCISES: CPT | Mod: GP | Performed by: PHYSICAL THERAPIST

## 2024-09-17 ASSESSMENT — PAIN SCALES - GENERAL: PAINLEVEL_OUTOF10: 1

## 2024-09-17 ASSESSMENT — PAIN - FUNCTIONAL ASSESSMENT: PAIN_FUNCTIONAL_ASSESSMENT: 0-10

## 2024-09-24 ENCOUNTER — TREATMENT (OUTPATIENT)
Dept: PHYSICAL THERAPY | Facility: CLINIC | Age: 69
End: 2024-09-24
Payer: COMMERCIAL

## 2024-09-24 DIAGNOSIS — M54.2 NECK PAIN ON LEFT SIDE: ICD-10-CM

## 2024-09-24 DIAGNOSIS — M47.812 OSTEOARTHRITIS OF CERVICAL SPINE, UNSPECIFIED SPINAL OSTEOARTHRITIS COMPLICATION STATUS: ICD-10-CM

## 2024-09-24 PROCEDURE — 97140 MANUAL THERAPY 1/> REGIONS: CPT | Mod: GP,CQ | Performed by: SPECIALIST/TECHNOLOGIST

## 2024-09-24 PROCEDURE — 97110 THERAPEUTIC EXERCISES: CPT | Mod: GP,CQ | Performed by: SPECIALIST/TECHNOLOGIST

## 2024-09-24 ASSESSMENT — PAIN - FUNCTIONAL ASSESSMENT: PAIN_FUNCTIONAL_ASSESSMENT: 0-10

## 2024-09-24 NOTE — PROGRESS NOTES
Physical Therapy  Physical Therapy Treatment    Patient Name: Raheel Bruno  MRN: [id number]  Today's Date: 9/24/2024  Time Calculation  Start Time: 1125  Stop Time: 1210  Time Calculation (min): 45 min    Insurance:  Visit number: 2 of 20   Authorization info: No Auth Needed  Insurance Type: [employer] (Ref# [id number])   Cert date start: 9/17/2024        Cert date end: 12/15/24    General  Reason for Referral: L neck pain  Referred By: NIA Lea MD    Current Problem  1. Osteoarthritis of cervical spine, unspecified spinal osteoarthritis complication status  Follow Up In Physical Therapy      2. Neck pain on left side  Follow Up In Physical Therapy          Precautions  STEADI Fall Risk Score (The score of 4 or more indicates an increased risk of falling): 0  Precautions Comment: HBP, gout    Pain Assessment: 0-10    Subjective:   Patient reports no real change since last session.  Patient notes still having L sided neck pain.  Patient still feels forward posture.  HEP Performed:  Yes although limited on weekend having traveled 8 hour by car on Friday and Sunday with a concert in between.      Objective:   Cerv rot R~L 80°   Cerv SB  R 45° L 50°     Treatments:   Stepper L2 5 min   Bravo row 5# 20x, stand ext 2.5# 20x  Bravo seated lats 15# 20x  Wall slide stability 4 pos 10x R/L   Supine cross fiber pecs   SS pecs 1'   Cross fiber R/L UT/Lev scap/SCM/Scalene/Cerv ext  Occiput release 5 min   L int rot stretch 1'  L shoulder mobs 3 min inf/post   SS R/L HF 1'     Charges: TE x2, Man     Assessment: Patient tolerated intensity noting still feeling tight post treatment.        Plan: Continue to improve ROM, anterior flexibility and scapular stability to improve posture and ADL      Duarte Walsh, PTA

## 2024-10-02 ENCOUNTER — TREATMENT (OUTPATIENT)
Dept: PHYSICAL THERAPY | Facility: CLINIC | Age: 69
End: 2024-10-02
Payer: COMMERCIAL

## 2024-10-02 DIAGNOSIS — E78.1 HYPERTRIGLYCERIDEMIA: ICD-10-CM

## 2024-10-02 DIAGNOSIS — M54.2 NECK PAIN ON LEFT SIDE: ICD-10-CM

## 2024-10-02 DIAGNOSIS — M47.812 OSTEOARTHRITIS OF CERVICAL SPINE, UNSPECIFIED SPINAL OSTEOARTHRITIS COMPLICATION STATUS: ICD-10-CM

## 2024-10-02 PROCEDURE — 97110 THERAPEUTIC EXERCISES: CPT | Mod: GP | Performed by: PHYSICAL THERAPIST

## 2024-10-02 PROCEDURE — 97140 MANUAL THERAPY 1/> REGIONS: CPT | Mod: GP | Performed by: PHYSICAL THERAPIST

## 2024-10-02 NOTE — PROGRESS NOTES
"Physical Therapy  Physical Therapy Treatment    Patient Name: Raheel Bruno  MRN: [id number]  Today's Date: 10/2/2024  Time Calculation  Start Time: 1300  Stop Time: 1355  Time Calculation (min): 55 min    Insurance:  Visit number: 3 of 20   Authorization info: No Auth Needed  Insurance Type: [employer] (Ref# [id number])   Cert date start: 9/17/2024        Cert date end: 12/15/24    General       Current Problem  1. Osteoarthritis of cervical spine, unspecified spinal osteoarthritis complication status  Follow Up In Physical Therapy      2. Neck pain on left side  Follow Up In Physical Therapy                      Subjective:   Pt presents to PT denying change in symptoms. Notes onset of tingling when performing L C/s SB. Reports HEP adherence. Notes mm soreness after last visit.    HEP Performed:  Yes     Objective:   Cerv rot R 57 deg L 60 deg  Cerv SB R 15 deg L 20 deg     Treatments:   Nustep L2 5'  Scapular rolls x20  Shoulder depression 15x5\" hold  Rows 2x15 5#  Shoulder ext 2x15 2.5#  CGPD 2x15 15#  SA wall slide x15 R/L  Wall slide stability 4 pos x10 R/L   L UT stretch x60\"  L LS stretch x60\"     Manual: STM L UT, LS, paraspinals; B suboccipital release; manual L UT stretch    Charges: TE x3, Man x1     Assessment: PT instructing pt to discontinue L UT stretch, however, continue R UT. Emphasis on correct scapular activation with exercise, pt demonstrating good carryover. Notes slight tingling after SA wall slide on L, resolving quickly. Denies change in symptoms after session. Educated on neck position while sleeping.      Plan: Continue to improve ROM, anterior flexibility and scapular stability to improve posture and ADL.      Cass Millan, PT  "

## 2024-10-03 RX ORDER — FENOFIBRATE 160 MG/1
160 TABLET ORAL DAILY
Qty: 90 TABLET | Refills: 3 | Status: SHIPPED | OUTPATIENT
Start: 2024-10-03

## 2024-10-10 ENCOUNTER — TREATMENT (OUTPATIENT)
Dept: PHYSICAL THERAPY | Facility: CLINIC | Age: 69
End: 2024-10-10
Payer: COMMERCIAL

## 2024-10-10 DIAGNOSIS — M47.812 OSTEOARTHRITIS OF CERVICAL SPINE, UNSPECIFIED SPINAL OSTEOARTHRITIS COMPLICATION STATUS: ICD-10-CM

## 2024-10-10 DIAGNOSIS — M54.2 NECK PAIN ON LEFT SIDE: ICD-10-CM

## 2024-10-10 PROCEDURE — 97140 MANUAL THERAPY 1/> REGIONS: CPT | Mod: GP,CQ | Performed by: SPECIALIST/TECHNOLOGIST

## 2024-10-10 PROCEDURE — 97110 THERAPEUTIC EXERCISES: CPT | Mod: GP,CQ | Performed by: SPECIALIST/TECHNOLOGIST

## 2024-10-10 ASSESSMENT — PAIN - FUNCTIONAL ASSESSMENT: PAIN_FUNCTIONAL_ASSESSMENT: 0-10

## 2024-10-10 NOTE — PROGRESS NOTES
"Physical Therapy  Physical Therapy Treatment    Patient Name: Raheel Bruno  MRN: [id number]  Today's Date: 10/10/2024  Time Calculation  Start Time: 1110  Stop Time: 1150  Time Calculation (min): 40 min    Insurance:  Visit number: 4 of 20   Authorization info: No Auth Needed  Insurance Type: [employer] (Ref# [id number])   Cert date start: 9/17/2024        Cert date end: 12/15/24    General  Reason for Referral: L neck pain  Referred By: NIA Lea MD    Current Problem  1. Osteoarthritis of cervical spine, unspecified spinal osteoarthritis complication status  Follow Up In Physical Therapy      2. Neck pain on left side  Follow Up In Physical Therapy            Precautions  STEADI Fall Risk Score (The score of 4 or more indicates an increased risk of falling): 0  Precautions Comment: HBP, gout    Pain Assessment: 0-10    Subjective:   Patient notes some soreness two sessions ago and not much after last session.    HEP Performed:  Yes     Objective:   Cerv rot R 57 deg L 60 deg  Cerv SB R 15 deg L 20 deg     Treatments:   Sci-fit L2 3' F/R   Scapular rolls x20  Shoulder depression 15x5\" hold  Rows 2x15 5#  Shoulder ext 2x15 2.5#  Seated lats 2x15 15#  Wall slide stability 4 pos x10 R/L   2 kg kb bottoms up carry 20 feet x4 R/L   SS R/L HF, int rot 1'   L UT stretch x60\"  L LS stretch x60\"     Manual: STM L UT, LS, paraspinals; B suboccipital release; manual L UT stretch    Charges: TE x2, Man x1     Assessment: Patient tolerated intensity with mild fatigue noting feeling ok post treatment.      Plan: Continue to improve ROM, anterior flexibility and scapular stability to improve posture and ADL.      Duarte Walsh, PTA  "

## 2024-10-22 ENCOUNTER — APPOINTMENT (OUTPATIENT)
Dept: PHYSICAL THERAPY | Facility: CLINIC | Age: 69
End: 2024-10-22
Payer: COMMERCIAL

## 2024-10-22 DIAGNOSIS — M47.812 OSTEOARTHRITIS OF CERVICAL SPINE, UNSPECIFIED SPINAL OSTEOARTHRITIS COMPLICATION STATUS: ICD-10-CM

## 2024-10-22 DIAGNOSIS — M54.2 NECK PAIN ON LEFT SIDE: ICD-10-CM

## 2024-10-22 PROCEDURE — 97110 THERAPEUTIC EXERCISES: CPT | Mod: GP,CQ | Performed by: SPECIALIST/TECHNOLOGIST

## 2024-10-22 ASSESSMENT — PAIN SCALES - GENERAL: PAINLEVEL_OUTOF10: 0 - NO PAIN

## 2024-10-22 ASSESSMENT — PAIN - FUNCTIONAL ASSESSMENT: PAIN_FUNCTIONAL_ASSESSMENT: 0-10

## 2024-10-22 NOTE — PROGRESS NOTES
"Physical Therapy  Physical Therapy Treatment    Patient Name: Raheel Bruno  MRN: [id number]  Today's Date: 10/22/2024  Time Calculation  Start Time: 1105  Stop Time: 1140  Time Calculation (min): 35 min    Insurance:  Visit number: 4 of 20   Authorization info: No Auth Needed  Insurance Type: [employer] (Ref# [id number])   Cert date start: 9/17/2024        Cert date end: 12/15/24    General  Reason for Referral: L neck pain  Referred By: NIA Lea MD    Current Problem  1. Osteoarthritis of cervical spine, unspecified spinal osteoarthritis complication status  Follow Up In Physical Therapy      2. Neck pain on left side  Follow Up In Physical Therapy            Precautions  STEADI Fall Risk Score (The score of 4 or more indicates an increased risk of falling): 0  Precautions Comment: HBP, gout    Pain Assessment: 0-10  0-10 (Numeric) Pain Score: 0 - No pain (general muscle soreness throughout arms & legs)    Subjective:   Patient arrived 20 minutes late thought the appointment was 11:15 am. Patient noted no pain but increased tingling down his neck. Patient had increased soreness after cleaning the oven.      HEP Performed:  Yes     Objective:   Cerv rot R 57 deg L 60 deg  Cerv SB R 15 deg L 20 deg     Treatments:   Nu-step L2 3' F/R  Bravo rows 7.5# 2x15  Shoulder ext 2x15 2.5#  Seated lats 2x15 15#  Wall slide stability 4 pos x10 R/L   2 kg kb bottoms up carry 20 feet x4 R/L   L shoulder mobs 5 min  SS R/L HF, int rot 1'   L UT stretch x60\"  L LS stretch x60\"     Manual: STM L UT, LS, paraspinals; B suboccipital release; manual L UT stretch    Charges: TE x2,    Assessment: Patient tolerated intensity noting some soreness with exercise throughout session.  Patient noted no increase in symptoms with treatment.       Plan: Continue to improve ROM, anterior flexibility and scapular stability to improve posture and ADL.      Duarte Walsh, PTA  "

## 2024-10-29 ENCOUNTER — TREATMENT (OUTPATIENT)
Dept: PHYSICAL THERAPY | Facility: CLINIC | Age: 69
End: 2024-10-29
Payer: COMMERCIAL

## 2024-10-29 DIAGNOSIS — M47.812 OSTEOARTHRITIS OF CERVICAL SPINE, UNSPECIFIED SPINAL OSTEOARTHRITIS COMPLICATION STATUS: ICD-10-CM

## 2024-10-29 DIAGNOSIS — M54.2 NECK PAIN ON LEFT SIDE: ICD-10-CM

## 2024-10-29 PROCEDURE — 97140 MANUAL THERAPY 1/> REGIONS: CPT | Mod: GP,CQ | Performed by: SPECIALIST/TECHNOLOGIST

## 2024-10-29 PROCEDURE — 97110 THERAPEUTIC EXERCISES: CPT | Mod: GP,CQ | Performed by: SPECIALIST/TECHNOLOGIST

## 2024-10-29 ASSESSMENT — PAIN - FUNCTIONAL ASSESSMENT: PAIN_FUNCTIONAL_ASSESSMENT: 0-10

## 2024-11-05 NOTE — PROGRESS NOTES
"Physical Therapy  Physical Therapy Orthopedic Discharge Note    Patient Name: Raheel Bruno  MRN: [id number]  Today's Date: 11/6/2024  Time Calculation  Start Time: 1100  Stop Time: 1130  Time Calculation (min): 30 min    Insurance:  Visit number: 6 of 20   Authorization info: No Auth Needed  Insurance Type: [employer] (Ref# [id number])   Cert date start: 9/17/2024        Cert date end: 12/15/24    General  Reason for Referral: L neck pain  Referred By: NIA Lea MD    Assessment: Pt demonstrating some progress with PT POC, demonstrating improved ROM, decreased cervical hypertonicity, and improved postural awareness. Reports 50% improvement with overall decreased intensity/frequency of symptoms. Reports HEP adherence. D/t progress made and independence with HEP, pt appropriate to DC to I HEP.       Plan: Updated 11/6/2024     DC to I HEP    Current Problem  1. Osteoarthritis of cervical spine, unspecified spinal osteoarthritis complication status  Follow Up In Physical Therapy      2. Neck pain on left side  Follow Up In Physical Therapy          Precautions:   Precautions  STEADI Fall Risk Score (The score of 4 or more indicates an increased risk of falling): 0  Precautions Comment: HBP, gout      Subjective:  Subjective   Patient reports some improvement initially with POC, however, during the last 2 weeks note increase in n/t, especially at night. Does note UT elevation while sleeping. Does note some increase in stress over the past 2 weeks. Performing HEP regularly at home. Overall, does note decrease in frequency/intensity of n/t.    Current Condition:   Better    Pain:  Pain Assessment: 0-10  0-10 (Numeric) Pain Score: 0 - No pain  Location:  L sided neck   Description:  Dull, n/t, \"itchy\" sensation  Aggravating Factors:  At night  Relieving Factors:  None  Pain Intensity:  0/10-1/10    Self Reported Function (0-100%) = 50%  - 100% being back to PLOF    Objective:  Objective   Palpation: TTP/hypertonicity L UT, LS, " "cornelia    Cervical AROM  Flexion 25 deg  Extension 55 deg  R SB 28 deg  L SB 32 deg  R ROT 54 deg  L ROT 60 deg    Scapulothoracic MMT  B strength deficits present    Special Tests:  Compression: (-)  Distraction: (-)  Spurling's: (+) L    DTR (0, 1+, 2+, 3+)  Biceps (C5) R 1+; L 1+  Triceps (C7) R 1+; L 1+    Dermatomes:   WFL    Myotomes:  Shoulder shrug (C4) R 5/5; L 5/5  Shoulder abd (C5) R 5/5; L 5/5  Elbow flex (C5/6) R 5/5; L 5/5  Wrist ext (C6) R 5/5; L 5/5  Elbow ext (C7) R 5/5; L 5/5  Wrist flex (C8) R 5/5; L 5/5  Thumb ext (C8) R 5/5; L 5/5  Finger abd (T1) R 5/5; L 5/5    ULNTT:  Radial R (-); L (-)  Ulnar R (-); L (-)  Median R (-); L (-)    Outcome Measures: Updated 11/6/2024  Other Measures  Neck Disability Index: 1/50     EDUCATION: home exercise program, plan of care, activity modifications, pain management, and injury pathology       Goals: Updated 11/6/2024  Resolved       PT Problem       STG (Met)       Start:  09/17/24    Expected End:  11/01/24    Resolved:  11/06/24    Patient demonstrate home exercise program adherence to supplement symptom reduction and functional gains made in clinic          LTG (Progressing)       Start:  09/17/24    Expected End:  12/16/24    Resolved:  11/06/24    Patient will report 75% reduction in n/t and \"itchy\" sensation to improve QOL - PARTIALLY MET  Patient will verbalize pain (0-10) <1/10 at worst to improve ADL tolerance - MET  Patient will demonstrate increased cervical AROM rotation >60 degrees, and side bending >25 degrees to improve ease during ADLs - PARTIALLY MET  Patient will demonstrate improvement on Neck Disability Index score 0/50 to demonstrate improved functional ability - PARTIALLY MET             Treatments:   Recheck performed, see objective measures  Updated and reviewed HEP    HEP Access Code: [email or web address]     Cass Millan, PT  "

## 2024-11-06 ENCOUNTER — TREATMENT (OUTPATIENT)
Dept: PHYSICAL THERAPY | Facility: CLINIC | Age: 69
End: 2024-11-06
Payer: COMMERCIAL

## 2024-11-06 DIAGNOSIS — M54.2 NECK PAIN ON LEFT SIDE: ICD-10-CM

## 2024-11-06 DIAGNOSIS — M47.812 OSTEOARTHRITIS OF CERVICAL SPINE, UNSPECIFIED SPINAL OSTEOARTHRITIS COMPLICATION STATUS: ICD-10-CM

## 2024-11-06 PROCEDURE — 97110 THERAPEUTIC EXERCISES: CPT | Mod: GP | Performed by: PHYSICAL THERAPIST

## 2024-11-06 ASSESSMENT — PAIN SCALES - GENERAL: PAINLEVEL_OUTOF10: 0 - NO PAIN

## 2024-11-06 ASSESSMENT — PAIN - FUNCTIONAL ASSESSMENT: PAIN_FUNCTIONAL_ASSESSMENT: 0-10

## 2025-01-06 PROBLEM — R42 DIZZINESS AND GIDDINESS: Status: ACTIVE | Noted: 2018-06-25

## 2025-01-06 PROBLEM — R68.84 JAW PAIN, NON-TMJ: Status: RESOLVED | Noted: 2023-09-26 | Resolved: 2025-01-06

## 2025-01-06 PROBLEM — I10 PRIMARY HYPERTENSION: Status: ACTIVE | Noted: 2018-06-25

## 2025-01-06 PROBLEM — R55 SYNCOPE AND COLLAPSE: Status: ACTIVE | Noted: 2018-06-25

## 2025-01-06 PROBLEM — E78.2 MIXED HYPERLIPIDEMIA: Status: ACTIVE | Noted: 2018-06-25

## 2025-01-06 PROBLEM — M25.539 PAIN IN WRIST: Status: ACTIVE | Noted: 2025-01-06

## 2025-01-07 ENCOUNTER — APPOINTMENT (OUTPATIENT)
Dept: PRIMARY CARE | Facility: CLINIC | Age: 70
End: 2025-01-07
Payer: COMMERCIAL

## 2025-01-07 VITALS
TEMPERATURE: 97.5 F | DIASTOLIC BLOOD PRESSURE: 84 MMHG | SYSTOLIC BLOOD PRESSURE: 132 MMHG | WEIGHT: 187.2 LBS | OXYGEN SATURATION: 97 % | BODY MASS INDEX: 31.96 KG/M2 | HEIGHT: 64 IN | HEART RATE: 61 BPM

## 2025-01-07 DIAGNOSIS — E78.1 HYPERTRIGLYCERIDEMIA: ICD-10-CM

## 2025-01-07 DIAGNOSIS — Z13.1 DIABETES MELLITUS SCREENING: ICD-10-CM

## 2025-01-07 DIAGNOSIS — M54.2 NECK PAIN ON LEFT SIDE: ICD-10-CM

## 2025-01-07 DIAGNOSIS — I10 PRIMARY HYPERTENSION: ICD-10-CM

## 2025-01-07 DIAGNOSIS — N52.9 ERECTILE DYSFUNCTION, UNSPECIFIED ERECTILE DYSFUNCTION TYPE: Primary | ICD-10-CM

## 2025-01-07 PROBLEM — Z00.00 ROUTINE GENERAL MEDICAL EXAMINATION AT HEALTH CARE FACILITY: Status: RESOLVED | Noted: 2023-06-14 | Resolved: 2025-01-07

## 2025-01-07 PROBLEM — R42 DIZZINESS AND GIDDINESS: Status: RESOLVED | Noted: 2018-06-25 | Resolved: 2025-01-07

## 2025-01-07 PROBLEM — R20.9: Status: RESOLVED | Noted: 2024-01-03 | Resolved: 2025-01-07

## 2025-01-07 LAB
POC ALBUMIN /CREATININE RATIO MANUALLY ENTERED: <30 UG/MG CREAT
POC HEMOGLOBIN A1C: 6.8 % (ref 4.2–6.5)
POC URINE ALBUMIN: 10 MG/L
POC URINE CREATININE: 200 MG/DL

## 2025-01-07 PROCEDURE — 1036F TOBACCO NON-USER: CPT | Performed by: FAMILY MEDICINE

## 2025-01-07 PROCEDURE — 3075F SYST BP GE 130 - 139MM HG: CPT | Performed by: FAMILY MEDICINE

## 2025-01-07 PROCEDURE — 1159F MED LIST DOCD IN RCRD: CPT | Performed by: FAMILY MEDICINE

## 2025-01-07 PROCEDURE — 83036 HEMOGLOBIN GLYCOSYLATED A1C: CPT | Performed by: FAMILY MEDICINE

## 2025-01-07 PROCEDURE — 99214 OFFICE O/P EST MOD 30 MIN: CPT | Performed by: FAMILY MEDICINE

## 2025-01-07 PROCEDURE — 1160F RVW MEDS BY RX/DR IN RCRD: CPT | Performed by: FAMILY MEDICINE

## 2025-01-07 PROCEDURE — 3079F DIAST BP 80-89 MM HG: CPT | Performed by: FAMILY MEDICINE

## 2025-01-07 PROCEDURE — 3008F BODY MASS INDEX DOCD: CPT | Performed by: FAMILY MEDICINE

## 2025-01-07 PROCEDURE — 82044 UR ALBUMIN SEMIQUANTITATIVE: CPT | Performed by: FAMILY MEDICINE

## 2025-01-07 PROCEDURE — 1123F ACP DISCUSS/DSCN MKR DOCD: CPT | Performed by: FAMILY MEDICINE

## 2025-01-07 RX ORDER — CETIRIZINE HYDROCHLORIDE 10 MG/1
10 TABLET ORAL DAILY
COMMUNITY

## 2025-01-07 RX ORDER — TADALAFIL 10 MG/1
10 TABLET ORAL DAILY PRN
Qty: 12 TABLET | Refills: 3 | Status: SHIPPED | OUTPATIENT
Start: 2025-01-07 | End: 2026-01-07

## 2025-01-07 ASSESSMENT — ENCOUNTER SYMPTOMS
DEPRESSION: 0
LOSS OF SENSATION IN FEET: 0
OCCASIONAL FEELINGS OF UNSTEADINESS: 0

## 2025-01-07 NOTE — ASSESSMENT & PLAN NOTE
Trial Cialis  PDE-5 inhibitor  The patient has been diagnosed with erectile dysfunction and cardiac assessment according to the [place] criteria allows use of oral PDE-5 inhibitor.  The patient understands that these medications are not initiators of erection and that he will still require sexual stimulation.  If prescribed vardenafil or sildenafil, he was advised to take the medication 30-60 minutes prior to sexual activity and that the efficacy of the medication is decreased following a high-fat meal.  The patient verbalized understanding that nitrates such as nitroglycerine, isosorbide mononitrate, isosorbide dinitrate and any other nitrate preparations are absolutely contraindicated with the use of a PDE-5 inhibitor. The patient was advised to alert any medical personal of PDE-5 inhibitor use should he seek urgent medical attention for any reason.  I explained the common adverse effects of therapy including, but not limited to headache, flushing, dizziness, rash, upset stomach, diarrhea, nasal congestion, abnormal vision, back pain, and myalgia.  Less common side effects are lightheadedness or blood pressure drop upon standing.   We discussed that patients have  after using medications in this class, and sometimes the exact cause of death was not able to be determined.  There is a very small risk of non-arteritic ischemic optic neuropathy, a rare cause of blindness that may be permanent while using medications in this class.  Patient is instructed to immediately stop this medication and seek medical attention if he develops visual disturbances in one or both eyes.  We discussed that if he experiences erection lasting longer than four hours, he needs to seek immediate treatment at the emergency department as the longer he waits, the more damage that is done to the penile tissue.    The patient states that he is not withholding any information about his condition or the use of medications in order to receive  a PDE-5 inhibitor class medication.  No barriers to learning were identified.  After all of the patient's questions were satisfactorily answered, he expressed understanding of the risks of therapy and wishes to proceed.

## 2025-01-07 NOTE — PROGRESS NOTES
"Subjective   Patient ID: Raheel Bruno is a 69 y.o. male who presents for 7 month follow up.    HPI   HLD: doing well with medication    HTN: doing well with medication, not checking home pressures    PreDM: feels that diet is good and maybe not as active, does feel like maybe a little bit of weight gain    Elevated PSA: denies any change in urinary symptoms, does not want to recheck at this time    ED: would like to try something     Review of Systems  Negative unless noted in HPI    Objective   /84 (BP Location: Right arm, Patient Position: Sitting, BP Cuff Size: Adult)   Pulse 61   Temp 36.4 °C (97.5 °F) (Temporal)   Ht 1.626 m (5' 4\")   Wt 84.9 kg (187 lb 3.2 oz)   SpO2 97%   BMI 32.13 kg/m²     Physical Exam  Constitutional:       Appearance: He is obese.   Cardiovascular:      Rate and Rhythm: Normal rate and regular rhythm.   Pulmonary:      Effort: Pulmonary effort is normal.      Breath sounds: Normal breath sounds.   Neurological:      General: No focal deficit present.      Mental Status: He is alert and oriented to person, place, and time.   Psychiatric:         Mood and Affect: Mood normal.         Assessment/Plan   Problem List Items Addressed This Visit             ICD-10-CM    Primary hypertension I10     Well controlled         Hypertriglyceridemia E78.1     Labs up to date         Neck pain on left side M54.2     Continue PT exercises  If not improving then can refer to ortho or pain management         Erectile dysfunction - Primary N52.9     Trial Cialis  PDE-5 inhibitor  The patient has been diagnosed with erectile dysfunction and cardiac assessment according to the [place] criteria allows use of oral PDE-5 inhibitor.  The patient understands that these medications are not initiators of erection and that he will still require sexual stimulation.  If prescribed vardenafil or sildenafil, he was advised to take the medication 30-60 minutes prior to sexual activity and that the efficacy " of the medication is decreased following a high-fat meal.  The patient verbalized understanding that nitrates such as nitroglycerine, isosorbide mononitrate, isosorbide dinitrate and any other nitrate preparations are absolutely contraindicated with the use of a PDE-5 inhibitor. The patient was advised to alert any medical personal of PDE-5 inhibitor use should he seek urgent medical attention for any reason.  I explained the common adverse effects of therapy including, but not limited to headache, flushing, dizziness, rash, upset stomach, diarrhea, nasal congestion, abnormal vision, back pain, and myalgia.  Less common side effects are lightheadedness or blood pressure drop upon standing.   We discussed that patients have  after using medications in this class, and sometimes the exact cause of death was not able to be determined.  There is a very small risk of non-arteritic ischemic optic neuropathy, a rare cause of blindness that may be permanent while using medications in this class.  Patient is instructed to immediately stop this medication and seek medical attention if he develops visual disturbances in one or both eyes.  We discussed that if he experiences erection lasting longer than four hours, he needs to seek immediate treatment at the emergency department as the longer he waits, the more damage that is done to the penile tissue.    The patient states that he is not withholding any information about his condition or the use of medications in order to receive a PDE-5 inhibitor class medication.  No barriers to learning were identified.  After all of the patient's questions were satisfactorily answered, he expressed understanding of the risks of therapy and wishes to proceed.           Relevant Medications    tadalafil (Cialis) 10 mg tablet     Other Visit Diagnoses         Codes    Diabetes mellitus screening     Z13.1    Relevant Orders    POCT glycosylated hemoglobin (Hb A1C) manually resulted  (Completed)    POCT Albumin random urine manually resulted (Completed)          Does have elevated PSA, we discussed this test and recommended follow up to reevaluate.  Declines at this time.  Does understand while there are false positives and a variety of reasons for positive testing, diagnosis of prostate cancer could be a cause for the elevation and cannot be excluded without further evaluation and retesting. Strongly encourage reevaluation in the near future, patient voices understanding.

## 2025-03-10 DIAGNOSIS — I10 HTN (HYPERTENSION), BENIGN: ICD-10-CM

## 2025-03-10 RX ORDER — LISINOPRIL 2.5 MG/1
2.5 TABLET ORAL DAILY
Qty: 90 TABLET | Refills: 3 | Status: SHIPPED | OUTPATIENT
Start: 2025-03-10

## 2025-05-13 DIAGNOSIS — E78.1 HYPERTRIGLYCERIDEMIA: ICD-10-CM

## 2025-05-13 RX ORDER — ATORVASTATIN CALCIUM 20 MG/1
20 TABLET, FILM COATED ORAL DAILY
Qty: 90 TABLET | Refills: 3 | Status: SHIPPED | OUTPATIENT
Start: 2025-05-13

## 2025-08-13 DIAGNOSIS — M10.9 ACUTE GOUT, UNSPECIFIED CAUSE, UNSPECIFIED SITE: Primary | ICD-10-CM

## 2025-08-13 RX ORDER — PREDNISONE 20 MG/1
40 TABLET ORAL DAILY
Qty: 10 TABLET | Refills: 0 | Status: SHIPPED | OUTPATIENT
Start: 2025-08-13 | End: 2025-08-18